# Patient Record
Sex: FEMALE | Race: WHITE
[De-identification: names, ages, dates, MRNs, and addresses within clinical notes are randomized per-mention and may not be internally consistent; named-entity substitution may affect disease eponyms.]

---

## 2017-07-19 ENCOUNTER — HOSPITAL ENCOUNTER (OUTPATIENT)
Dept: HOSPITAL 80 - CIMAGING | Age: 72
End: 2017-07-19
Attending: FAMILY MEDICINE
Payer: COMMERCIAL

## 2017-07-19 DIAGNOSIS — Z12.31: Primary | ICD-10-CM

## 2017-07-19 PROCEDURE — G0202 SCR MAMMO BI INCL CAD: HCPCS

## 2018-06-19 ENCOUNTER — HOSPITAL ENCOUNTER (OUTPATIENT)
Dept: HOSPITAL 80 - FPAT | Age: 73
End: 2018-06-19
Attending: INTERNAL MEDICINE
Payer: COMMERCIAL

## 2018-06-19 DIAGNOSIS — Z01.812: ICD-10-CM

## 2018-06-19 DIAGNOSIS — Z01.810: Primary | ICD-10-CM

## 2018-06-20 NOTE — CPEKG
Heart Rate: 87

RR Interval: 690

P-R Interval: 176

QRSD Interval: 86

QT Interval: 372

QTC Interval: 448

P Axis: 66

QRS Axis: 55

T Wave Axis: 39

EKG Severity - NORMAL ECG -

EKG Impression: SINUS RHYTHM

Electronically Signed By: Davide Soni 20-Jun-2018 16:35:02

## 2018-06-25 ENCOUNTER — HOSPITAL ENCOUNTER (OUTPATIENT)
Dept: HOSPITAL 80 - FSGY | Age: 73
Discharge: HOME | End: 2018-06-25
Attending: INTERNAL MEDICINE
Payer: COMMERCIAL

## 2018-06-25 VITALS — DIASTOLIC BLOOD PRESSURE: 60 MMHG | SYSTOLIC BLOOD PRESSURE: 131 MMHG

## 2018-06-25 DIAGNOSIS — E66.9: ICD-10-CM

## 2018-06-25 DIAGNOSIS — Z87.891: ICD-10-CM

## 2018-06-25 DIAGNOSIS — K57.30: ICD-10-CM

## 2018-06-25 DIAGNOSIS — K63.5: Primary | ICD-10-CM

## 2018-06-25 DIAGNOSIS — I10: ICD-10-CM

## 2018-06-25 DIAGNOSIS — E11.9: ICD-10-CM

## 2018-06-25 PROCEDURE — 0DBN8ZZ EXCISION OF SIGMOID COLON, VIA NATURAL OR ARTIFICIAL OPENING ENDOSCOPIC: ICD-10-PCS | Performed by: INTERNAL MEDICINE

## 2018-06-25 NOTE — POSTANESTH
Post Anesthetic Evaluation


Cardiovascular Status: Normal, Stable, Similar to Pre-Op Cond


Respiratory Status: Normal, Stable, Similar to Pre-op Cond.


Level of Consciousness/Mental Status: Can Participate in Eval, Moderately Sleepy


Pain Control: Adequate, Prn Tx Ordered


Nausea/Vomiting Control: Adequate, Prn Tx Ordered


Complications Possibly Related to Anesthesia: None Noted

## 2018-06-25 NOTE — GIREPORT
Formerly [facility], [facility]

Surgical Services - Endoscopy Department

_____________________________________________________________________________________________________
_______

Patient Name: Marisela Haq                            Procedure Date: 6/25/2018 9:49 AM

MRN: [id number]                                       Account Number: [id number]

Patient Type: Outpatient                             Attending  MD/ ER Physician: Ricky Mortnesen MD

_____________________________________________________________________________________________________
_______

 

Procedure:                    Colonoscopy

Indications:                  High risk colon cancer surveillance: Personal history of colonic polyps


Providers:                    Ricky Mortensen MD

Medicines:                    Propofol per Anesthesia

Complications:                No immediate complications.

Description of Procedure:     After obtaining informed consent, the scope was passed under direct vis
ion. 

                              Throughout the procedure, the patient's blood pressure, pulse, and oxyg
en 

                              saturations were monitored continuously. The Colonoscope with irrigatio
n 

                              channel was introduced through the anus and advanced to the cecum, 

                              identified by appendiceal orifice and ileocecal valve. The colonoscopy 
was 

                              performed without difficulty. The patient tolerated the procedure well.
 The 

                              quality of the bowel preparation was excellent. The ileocecal valve, 

                              appendiceal orifice, and rectum were photographed.

Findings:                     The perianal and digital rectal examinations were normal. Pertinent 

                              negatives include normal sphincter tone and no palpable rectal lesions.


                              A 5 mm polyp was found in the sigmoid colon. The polyp was sessile. The
 

                              polyp was removed with a cold biopsy forceps. Resection and retrieval w
ere 

                              complete.

                              Multiple small-mouthed diverticula were found in the sigmoid colon and 


                              ascending colon.

Estimated Blood Loss:         Estimated blood loss: none.

Post Op Diagnosis:            - One 5 mm polyp in the sigmoid colon, removed with a cold biopsy force
ps. 

                              Resected and retrieved.

                              - Diverticulosis.

Recommendation:               - Await pathology results.

                              - Repeat colonoscopy in 5 years for surveillance.

                              - Resume previous diet.

                              - Continue present medications.

                              - Patient has a contact number available for emergencies. The signs and
 

                              symptoms of potential delayed complications were discussed with the pat
ient. 

                              Return to normal activities tomorrow. Written discharge instructions we
re 

                              provided to the patient.

                              - Thank you for allowing me to be involved in the care of your patient.


Attending Participation:

     I personally performed the entire procedure without the assistance of a fellow, resident or surg
ical 

     assistant.

 

Ricky Mortensen MD

_______________

Ricky Mortensen MD

6/25/2018 10:21:30 AM

This report has been signed electronicallyDavid MD [place]

Number of Addenda: 0

 

Note Initiated On: 6/25/2018 9:49 AM

Total Procedure Duration Time 0 hours 17 minutes 16 seconds 

[email or web address]

## 2018-06-25 NOTE — PDGENHP
History & Physical


Chief Complaint: Surveillance colonoscopy


History of Present Illness: Personal history of colon polyps.  Surveillance 

colonoscopy with MAC


Pertinent Past, Social, Family History: DM type II, HTN, Obestiy.  No Tob.  

Rare ETOH.  No FH CRC


Relevant Physical Exam: NAD.  CTA B/L.  RRR without m/r/g. Distant heart 

sounds.  GI obese. Soft. NT/ND. NABS.


Cardiorespiratory Assessment: Colonoscopy with MAC.  ASA III

## 2018-06-25 NOTE — PDANEPAE
ANE Past Medical History





- Cardiovascular History


Hx Hypertension: Yes


Hx Arrhythmias: No


Hx Chest Pain: No


Hx Coronary Artery / Peripheral Vascular Disease: No


Hx CHF / Valvular Disease: No


Hx Palpitations: No





- Pulmonary History


Hx COPD: No


Hx Asthma/Reactive Airway Disease: No


Hx Recent Upper Respiratory Infection: No


Hx Oxygen in Use at Home: No


Hx Sleep Apnea: No


Sleep Apnea Screening Result - Last Documented: Negative





- Neurologic History


Hx Cerebrovascular Accident: No


Hx Seizures: No


Hx Dementia: No





- Endocrine History


Hx Diabetes: Yes


Obesity: moderate


Endocrine History Comment: type 11





- Renal History


Hx Renal Disorders: No





- Liver History


Hx Hepatic Disorders: No





- Neurological & Psychiatric Hx


Hx Neurological and Psychiatric Disorders: Yes


Neurological / Psychiatric History Comment: neuropathy right great toe





- Cancer History


Hx Cancer: No





- Congenital Disorder History


Hx Congenital Disorders: No





- GI History


Hx Gastrointestinal Disorders: Yes


Gastrointestinal History Comment: occasional diarhhea





- Other Health History


Other Health History: none





- Chronic Pain History


Chronic Pain: Yes (lower back, hips)





- Surgical History


Prior Surgeries: none





ANE Review of Systems


Review of Systems: 








- Exercise capacity


METS (RN): 3 METS





ANE Patient History





- Allergies


Allergies/Adverse Reactions: 








Iodinated Contrast- Oral and IV Dye Allergy (Verified 06/05/18 10:56)


 








- Home Medications


Home Medications: 








Acetaminophen  06/05/18 [Last Taken 06/25/18 07:00]


Ambien  06/05/18 [Last Taken 06/11/18]


Ascorbic Acid  06/05/18 [Last Taken 06/18/18]


Aspirin  06/05/18 [Last Taken 06/18/18]


Calcium Citrate/Vitamin D3  06/05/18 [Last Taken 06/18/18]


Cholecalciferol (Vitamin D3)  06/05/18 [Last Taken 06/18/18]


Glimepiride  06/05/18 [Last Taken 06/24/18]


Lantus  06/05/18 [Last Taken 06/24/18]


Lisinopril/Hydrochlorothiazide  06/05/18 [Last Taken 06/24/18]


Metformin HCl  06/05/18 [Last Taken 06/24/18]


Simvastatin  06/05/18 [Last Taken 06/24/18]


Vit B 12  06/05/18 [Last Taken 06/18/18]








- NPO status


NPO Since - Liquids (Date): 06/24/18


NPO Since - Liquids (Time): 21:00


NPO Since - Solids (Date): 06/24/18


NPO Since - Solids (Time): 09:00





- Anes Hx


Anes Hx: no prior problems





- Smoking Hx


Smoking Status: Former smoker





- Family Anes Hx


Family Hx Anesthesia Complications: none





ANE Labs/Vital Signs





- Vital Signs


Blood Pressure: 104/68


Heart Rate: 89


Respiratory Rate: 15


O2 Sat (%): 97


Height: 152.4 cm


Weight: 77.111 kg





ANE Physical Exam





- Airway


Neck exam: FROM


Mallampati Score: Class 3


Mouth exam: normal dental/mouth exam





- Pulmonary


Pulmonary: no respiratory distress, no rales or rhonchi, clear to auscultation





- Cardiovascular


Cardiovascular: regular rate and rhythym, no murmur, rub, or gallop





- ASA Status


ASA Status: III





ANE Anesthesia Plan


Anesthesia Plan: GA with mask

## 2019-01-03 ENCOUNTER — HOSPITAL ENCOUNTER (INPATIENT)
Dept: HOSPITAL 80 - F3N | Age: 74
LOS: 2 days | Discharge: HOME | DRG: 454 | End: 2019-01-05
Attending: ORTHOPAEDIC SURGERY | Admitting: ORTHOPAEDIC SURGERY
Payer: COMMERCIAL

## 2019-01-03 DIAGNOSIS — Z79.84: ICD-10-CM

## 2019-01-03 DIAGNOSIS — I10: ICD-10-CM

## 2019-01-03 DIAGNOSIS — G97.41: ICD-10-CM

## 2019-01-03 DIAGNOSIS — D64.89: ICD-10-CM

## 2019-01-03 DIAGNOSIS — M84.48XA: ICD-10-CM

## 2019-01-03 DIAGNOSIS — E86.9: ICD-10-CM

## 2019-01-03 DIAGNOSIS — M47.26: ICD-10-CM

## 2019-01-03 DIAGNOSIS — E11.9: ICD-10-CM

## 2019-01-03 DIAGNOSIS — M51.16: ICD-10-CM

## 2019-01-03 DIAGNOSIS — M43.16: Primary | ICD-10-CM

## 2019-01-03 DIAGNOSIS — Z79.4: ICD-10-CM

## 2019-01-03 PROCEDURE — 00QT0ZZ REPAIR SPINAL MENINGES, OPEN APPROACH: ICD-10-PCS | Performed by: ORTHOPAEDIC SURGERY

## 2019-01-03 PROCEDURE — P9016 RBC LEUKOCYTES REDUCED: HCPCS

## 2019-01-03 PROCEDURE — 0SG10AJ FUSION OF 2 OR MORE LUMBAR VERTEBRAL JOINTS WITH INTERBODY FUSION DEVICE, POSTERIOR APPROACH, ANTERIOR COLUMN, OPEN APPROACH: ICD-10-PCS | Performed by: ORTHOPAEDIC SURGERY

## 2019-01-03 PROCEDURE — 01NB0ZZ RELEASE LUMBAR NERVE, OPEN APPROACH: ICD-10-PCS | Performed by: ORTHOPAEDIC SURGERY

## 2019-01-03 PROCEDURE — 0SG1071 FUSION OF 2 OR MORE LUMBAR VERTEBRAL JOINTS WITH AUTOLOGOUS TISSUE SUBSTITUTE, POSTERIOR APPROACH, POSTERIOR COLUMN, OPEN APPROACH: ICD-10-PCS | Performed by: ORTHOPAEDIC SURGERY

## 2019-01-03 PROCEDURE — C1713 ANCHOR/SCREW BN/BN,TIS/BN: HCPCS

## 2019-01-03 PROCEDURE — 30233H0 TRANSFUSION OF AUTOLOGOUS WHOLE BLOOD INTO PERIPHERAL VEIN, PERCUTANEOUS APPROACH: ICD-10-PCS | Performed by: ORTHOPAEDIC SURGERY

## 2019-01-03 PROCEDURE — 0ST20ZZ RESECTION OF LUMBAR VERTEBRAL DISC, OPEN APPROACH: ICD-10-PCS | Performed by: ORTHOPAEDIC SURGERY

## 2019-01-03 PROCEDURE — 4A10X4G MONITORING OF CENTRAL NERVOUS ELECTRICAL ACTIVITY, INTRAOPERATIVE, EXTERNAL APPROACH: ICD-10-PCS | Performed by: ORTHOPAEDIC SURGERY

## 2019-01-03 PROCEDURE — 8E0WXBG COMPUTER ASSISTED PROCEDURE OF TRUNK REGION, WITH COMPUTERIZED TOMOGRAPHY: ICD-10-PCS | Performed by: ORTHOPAEDIC SURGERY

## 2019-01-03 RX ADMIN — Medication SCH MLS: at 22:59

## 2019-01-03 RX ADMIN — INSULIN HUMAN SCH UNITS: 100 INJECTION, SOLUTION PARENTERAL at 18:29

## 2019-01-03 RX ADMIN — INSULIN GLARGINE SCH UNITS: 100 INJECTION, SOLUTION SUBCUTANEOUS at 20:48

## 2019-01-03 RX ADMIN — DOCUSATE SODIUM AND SENNOSIDES SCH TAB: 50; 8.6 TABLET ORAL at 20:42

## 2019-01-03 RX ADMIN — ACETAMINOPHEN SCH MG: 500 TABLET ORAL at 21:28

## 2019-01-03 RX ADMIN — HYDROMORPHONE HYDROCHLORIDE PRN MG: 2 INJECTION INTRAMUSCULAR; INTRAVENOUS; SUBCUTANEOUS at 13:19

## 2019-01-03 RX ADMIN — METHOCARBAMOL PRN MG: 750 TABLET ORAL at 15:20

## 2019-01-03 RX ADMIN — OXYCODONE HYDROCHLORIDE PRN MG: 15 TABLET ORAL at 15:01

## 2019-01-03 RX ADMIN — OXYCODONE HYDROCHLORIDE PRN MG: 15 TABLET ORAL at 20:42

## 2019-01-03 RX ADMIN — FAMOTIDINE SCH MG: 20 TABLET, FILM COATED ORAL at 20:42

## 2019-01-03 RX ADMIN — ACETAMINOPHEN SCH MG: 500 TABLET ORAL at 15:00

## 2019-01-03 RX ADMIN — GLIMEPIRIDE SCH MG: 2 TABLET ORAL at 18:34

## 2019-01-03 RX ADMIN — HYDROMORPHONE HYDROCHLORIDE PRN MG: 2 INJECTION INTRAMUSCULAR; INTRAVENOUS; SUBCUTANEOUS at 12:34

## 2019-01-03 RX ADMIN — Medication SCH MLS: at 15:02

## 2019-01-03 RX ADMIN — INSULIN HUMAN SCH UNITS: 100 INJECTION, SOLUTION PARENTERAL at 21:30

## 2019-01-03 RX ADMIN — HYDROMORPHONE HYDROCHLORIDE PRN MG: 2 INJECTION INTRAMUSCULAR; INTRAVENOUS; SUBCUTANEOUS at 12:46

## 2019-01-03 RX ADMIN — AZELASTINE HYDROCHLORIDE SCH SPRAY: 137 SPRAY, METERED NASAL at 20:44

## 2019-01-03 NOTE — POSTANESTH
Post Anesthetic Evaluation


Cardiovascular Status: Normal, Stable


Respiratory Status: Normal, Stable


Level of Consciousness/Mental Status: Can Participate in Eval


Pain Control: Adequate, Prn Tx Ordered


Nausea/Vomiting Control: Inadeq, Add Tx Reqired


Complications Possibly Related to Anesthesia: None Noted

## 2019-01-03 NOTE — SUROPNOTE
[facility] Operative Report





- Surgery





Patient initials: LB


Date: 1/3/18


Pre-operative Diagnoses:


~ L3-5 Lumbar Degenerative disc disease and spondylosis


~ L4/5 Degenerative spondylolisthesis


~ Bilateral L4 pars insufficiency fractures


~ Lumbar spinal stenosis


Post-operative Diagnosis:~Same


Procedures:~


~ L3-5 Posterior Lumbar Decompression and Fusion with Instrumentation


~ Right L4/5 Transforaminal Lumbar Interbody Fusion (TLIF)


~ Structural use of morselized local autograft bone


~ Structural use of allograft~


~ Use of intra-operative fluoroscopy


~ Use of computer-aided stereotactic navigation


~ Use of intra-operative neuromonitoring, including EMG, MEP, and SSEP 

modalities


~ Use of a surgical microscope~~~


Surgeon:~Jm Laureano MD


Assist:~Barbara Howe


Anesthesia:~General endotracheal anesthesia


Findings:~As expected


Estimated Blood Loss:~500mL


Drains:~Hemovac sewn to skin


Specimens:~None


Complications:~None


Condition:~Transferred to PACU in stable condition.


Implants:~Medtronic Solera


~~Screws:~6.5x45 L3, L4; 6.5x40mm, L5


~~Rods:~60mm x 2 Ti 5.5


~~TLIF cage:~L4/5 7(11) x 23 Elevate Medtronic cage


~~Allograft:~10-mL allograft used structurally within the disc space and 

posterolateral gutters


~~Autograft:~local bone from decompression used structurally





Indications:


This patient was seen in my office and diagnosed with degenerative 

spondylolisthesis and spinal stenosis. I have explained all options of 

treatment for the patient, and the patient has elected to proceed with 

operative management. I have explained all risks, benefits, and alternatives of 

the proposed procedure. The risks that we have discussed include death, 

blindness, nerve damage, infection, dural tear, failure of surgery to alleviate 

pre-operative symptoms, and possible need for further operation. I explained 

separately the risks of allograft, including infection and disease transfer. In 

addition to the aforementioned procedure, I discussed with the patient that 

other procedures may be indicated during the course of surgery that would be 

considered in the patients best interest. The patient expressed understanding 

of this and agreed to move forward with operative management.





Pre-operative:


The proposed incision site was marked in the pre-operative holding area by me. 

The patient was then taken to the operating room in stable condition. Following 

smooth induction of general anesthesia, the patient was positioned prone on a 

Rommel table with all down surfaces well-padded. The patient was then prepped 

and draped in the usual sterile fashion. Pre-operative antibiotics were 

administered within one hour of the incision. A surgical timeout was performed, 

and all parties involved in the procedure were in agreement on the correct 

patient, location, and procedure to be performed.


Approach and dissection:


The proposed levels were identified using C-arm fluoroscopy and the skin was 

marked for the proposed incision. A midline incision was then made. 

Electrocautery was used to dissect the subdermal fat layer down to fascia and 

to coagulate bleeding vessels. The fascia was spared for later closure.





Spinal pause:


Two Kocher clamps were then placed on the spinous processes at both ends of the 

dissection. A secondary spinal pause was then performed, and the level was 

confirmed with all parties participating in the operation. The clamps were 

noted to be at the L3 and L5 levels.





Navigation:


At this point, the patient was covered, and an intra-operative CT scan was 

performed. Computer-aided stereotactic navigation would be used throughout some 

of the instrumentation portion of the case. After placement of the right L5 

screw, the navigation was noted to be poorly calibrated. Lateral xrays were 

used following that adjusement.





Pedicle screw placement:


Using a freehand technique with fluoroscopic assistance, a standard entry point 

was selected based on bony landmarks. A  hole was created with a high 

speed jimmie. The pedicle tract was then cannulated using a curved Lenke probe. A 

ball-tipped feeler was then passed through the cannulated tract to ensure that 

there was no breach on all sides: superior, inferior, medial, lateral, and 

ventral. A tap was then used up to a size that was 1mm below the final screw 

size based on pre-operative templating. A ball-tipped feeler was used again in 

a similar fashion to ensure a safe trajectory without breach. The tract length 

was then measured and recorded for future pedicle screw selection and 

placement. The pedicle tract neo then injected with a small amount of 

microfibrillar collagen agent to ensure hemostasis. This sequence was performed 

for all levels that would be included in the fusion. An appropriately-sized 

screw was placed at each level and confirmed fluoroscopically. All screws were 

then stimulated. Each screw was stimulated and potentials were all above 20 

milliAmps. This process was repeated in a similar fashion on the contralateral 

side.





Xray:


At this point, an additional lateral xray was performed to ensure that the 

proper placement of all screws.





Decompression:


All paraspinal muscles were dissected sub-periostally from the spinous 

processes and laminae. The dissection was then carried out to include the 

extent of decompression that was determined before surgery, with care being 

taken to preserve facet capsules that would not be included in the final 

fusion. The lateral pars was identified for all levels to be included in the 

proposed decompression. Using a combination of rongeur, jimmie, and Kerrison 

rongeurs, the spinous processes and laminae were removed at all levels to the 

subarticular lateral recess. Care was taken to leave a minimum of 8mm of bone 

from the lateral border of the pars at each decompressed level. The ligamentum 

flavum was resected at all levels. Using a Kerrison rongeur angled back, 

additional ligamentum flavum was removed from under the caudal aspect of the 

cephalad-most level, and from under the cephalad aspect of the caudal-most 

level. Additional care was taken to adequately decompress the lateral recess at 

all levels. At this time, a ball probe was used to probe all foraminae at the 

affected levels. Where necessary, a small Kerrison rongeur was used to 

decompress remaining bone and soft tissue so that all nerve roots would 

traverse freely through the foraminae. In total, the entire laminae of L3-L5 

were removed.





Of note, part of the dura was adhered to the ventral lamina at L3/4. A small 

dural tear was noted here, and was repaired primarily with 5-0 goretex suture 

without difficulty. The area was noted not to leak any CSF following the repair.





TLIF:


Based on pre-operative neurological symptoms, a decision was made with the 

patient to place a TLIF cage in the L4/5 interbody space from the right. Taking 

care to avoid breach into the previously created pedicle tract, a facetectomy 

was performed at the TLIF level using a high speed jimmie and Kerrison rongeurs. 

Special care was taken to not use the high speed jimmie in proximity to the 

underlying nerve root. Using a Penfield #4, the nerve was protected cephalad 

and Kambins triangle was identified (bordered by the nerve root cephalad and 

the thecal sac medially). The disc space was identified by appearance and 

consistency. With a disc knife angled away from neural structures, the annulus 

was transected and removed using a disc punch. A disc space dilator was used to 

free any remaining annulus, which was removed using a disc punch and/or 

scalpel. All remaining nucleus pulposus and cartilage from both cephalad and 

caudal end plates were removed using a series of straight and angled disc 

preparation curettes and paddle reva. A blunt-tipped long 10mL syringe was 

then passed into the disc space and flushed in order to remove any additional 

disc fragments; this was done multiple times until no further loose fragments 

were produced. Trial TLIF spacers were then passed into the disc space to both 

dilate the space and adequately size the final implant. Local autograft bone 

was then combined with allograft bone; 5mL of bone was inserted into the disc 

space using a specialized bone insertion device, and the remaining bone was 

placed into the interbody cage. An appropriately-sized cage was selected, 

packed with allograft and local autograft bone, and attached to an inserted 

handle. The TLIF cage was then malleted into place and positioning was 

confirmed by visual inspection and biplanar radiographs. Any bleeding epidural 

vessels were coagulated using bipolar cautery.





Bone graft:


All bony surfaces were decorticated using a high speed jimmie, including all 

facets to be fused. All local autograft bone was cleaned of soft tissue and 

morselized. All remaining allograft and autograft bone was placed in the facets 

being fused and lateral to the instrumentation in the posterolateral gutters.





Jerardo and set screw placement:


An appropriately-sized 60mm jerardo was then placed into the pedicle screw heads, 

with ample jerardo extending from either end. Set screws were then placed into all 

pedicle screws over the rods, and tensioned using a torque-limited screwdriver.





Closure:


The surgical field was then copiously irrigated with sterile saline. Of note, a 

small pinpoint hole was noted in the dura at the L4/5 level. Duraseal was 

placed on the dura. There was no spinal fluid leak seen during this time. 

Vancomycin powder was then applied to the surgical field. A small drain was 

placed deep to the fascia and brought out of the skin superior and laterally. 

The drain was then sewn to skin. #1 vicryl suture were used to repair the 

fascia in an interrupted fashion. Then 2-0 interrupted sutures were used to 

repair the dermal layer, and a separate 3-0 monofilament suture was used to 

repair the subcutaneous layer in a running fashion. All sutures used were 

absorbable. Topical adhesive was then applied to the skin and allowed to dry. A 

sterile island dressing was applied over the surgical incision. A surgical 

count was performed before initiation of closure and following the procedure, 

and all were correct. I was present for all critical portions of the procedure.





Surgical microscope use:


A surgical microscope was utilized throughout the decompressive portion of this 

case. This was deemed necessary for safe and accurate surgical decompression of 

affected nerve roots.





Neuromonitoring:


SSEP, MEP and EMG were used throughout the case from incision until the 

beginning of closure. There were no significant changes throughout the case, 

and SSEP signals were at their pre-surgical baseline levels before surgical 

closure was initiated.





Surgical assist:


A surgical assistant was used throughout the case, and deemed necessary for 

safe neural retraction, hemostasis, and suction.





Recovery:


The patient was extubated uneventfully in the operating room. The patient was 

taken to the recovery room in stable condition. Sequential compression devices 

for VTE prophylaxis were applied to the patients lower extremities, and were 

ordered to be used while the patient was non-ambulatory. Chemical VTE 

prophylaxis was considered to be contraindicated for this patient because of 

the risk of bleeding near the epidural space.~





Jm Laureano MD

## 2019-01-03 NOTE — PDANEPAE
ANE Past Medical History





- Cardiovascular History


Hx Hypertension: Yes


Hx Arrhythmias: No


Hx Chest Pain: No


Hx Coronary Artery / Peripheral Vascular Disease: No


Hx CHF / Valvular Disease: No


Hx Palpitations: No


Cardiovascular History Comment: Denies any cardiac history. Negative stress 

test several years ago. No SOB or CP with climbing a flight of stairs.





- Pulmonary History


Hx COPD: No


Hx Asthma/Reactive Airway Disease: No


Hx Recent Upper Respiratory Infection: No


Hx Oxygen in Use at Home: No


Hx Sleep Apnea: No


Sleep Apnea Screening Result - Last Documented: Negative


Pulmonary History Comment: treated with ABX for sinus infection 12/2018





- Neurologic History


Hx Cerebrovascular Accident: No


Hx Seizures: No


Hx Dementia: No


Neurologic History Comment: numbness, tingling BLE's, R>L





- Endocrine History


Hx Diabetes: Yes


Endocrine History Comment: DM Type 2





- Renal History


Hx Renal Disorders: No





- Liver History


Hx Hepatic Disorders: No





- Neurological & Psychiatric Hx


Hx Neurological and Psychiatric Disorders: No


Neurological / Psychiatric History Comment: neuropathy right great toe





- Cancer History


Hx Cancer: No





- Congenital Disorder History


Hx Congenital Disorders: No





- GI History


Hx Gastrointestinal Disorders: Yes


Gastrointestinal History Comment: frequent diarrhea





- Other Health History


Other Health History: wears glasses.  anemia.  chronic right hip, low back, 

left knee pain





- Chronic Pain History


Chronic Pain: Yes (right hip, leg)





- Surgical History


Prior Surgeries: colonoscopies.  arthroscopic partial meniscectomy, left knee, 

2008.  tonsillectomy





ANE Review of Systems


Review of Systems: 








- Exercise capacity


METS (RN): 3 METS





ANE Patient History





- Allergies


Allergies/Adverse Reactions: 








Iodinated Contrast- Oral and IV Dye Allergy (Verified 12/24/18 15:31)


 swelling at IV site








- Home Medications


Home Medications: 








Acetaminophen [Tylenol  mg (*)] 1,000 mg PO Q6HRS PRN 06/05/18 [Last 

Taken 01/03/19 0500]


Ascorbic Acid [Vitamin C 500 mg (*)] 1,000 mg PO HS 06/05/18 [Last Taken 12/27/ 18]


Aspirin [Aspirin 81mg (*)] 81 mg PO HS 06/05/18 [Last Taken 12/27/18]


Cholecalciferol Vit D3 [Vitamin D3 (*)] 1,000 units PO DAILY 06/05/18 [Last 

Taken 12/27/18]


Cyanocobalamin [Vitamin B12 (*)] 2,500 mcg PO HS 06/05/18 [Last Taken 12/27/18]


Glimepiride [Amaryl 2 MG (*)] 2 mg PO BIDMEAL 06/05/18 [Last Taken 01/02/19]


Insulin Glargine [Lantus] 20 unit SC HS 06/05/18 [Last Taken 01/02/19 25units]


Lisinopril/Hydrochlorothiazide [Zestoretic 20-25 mg Tablet] 1 each PO DAILY 06/ 05/18 [Last Taken 01/02/19]


Simvastatin [Zocor] 10 mg PO HS 06/05/18 [Last Taken 01/02/19]


metFORMIN HCL [Glucophage 500 mg (*)] 500 mg PO DAILY18 06/05/18 [Last Taken 01/ 01/19]


Azelastine [Astelin Nasal Spray (RX)] 1 sprays EACHNARE HS 12/17/18 [Last Taken 

01/02/19]


Diclofenac Sodium 1% [Voltaren Gel (*)] 1 wyatt TP DAILY PRN 12/17/18 [Last Taken 

12/24/18]


Herbals/Supplements -Info Only 1 ea PO DAILY 12/17/18 [Last Taken 12/27/18]


metFORMIN HCL [Glucophage 1000 mg] 1,000 mg PO DAILY 12/17/18 [Last Taken 01/02/ 19]








- NPO status


NPO Since - Liquids (Date): 01/03/19


NPO Since - Liquids (Time): 05:00


NPO Since - Solids (Date): 01/02/19


NPO Since - Solids (Time): 20:00





- Smoking Hx


Smoking Status: Former smoker





- Family Anes Hx


Family Hx Anesthesia Complications: none





ANE Labs/Vital Signs





- Vital Signs


Blood Pressure: 143/68


Heart Rate: 91


Respiratory Rate: 16


O2 Sat (%): 98


Height: 152.4 cm


Weight: 72.575 kg





ANE Physical Exam





- Airway


Neck exam: FROM


Mallampati Score: Class 2


Mouth exam: normal dental/mouth exam





- Pulmonary


Pulmonary: no respiratory distress, no rales or rhonchi, clear to auscultation





- Cardiovascular


Cardiovascular: regular rate and rhythym, no murmur, rub, or gallop





- ASA Status


ASA Status: III





ANE Anesthesia Plan


Anesthesia Plan: general endotracheal anesthesia

## 2019-01-03 NOTE — PDMN
Medical Necessity


Medical necessity: [facility] [place] [place] S820 Lumbar Fusion: 74 yo s/p L3/4, L4/5 TLIF w/ stealth 

and L3-5 posterior lumbar discectomy w/ stealth, [facility] only

## 2019-01-04 LAB — PLATELET # BLD: 262 10^3/UL (ref 150–400)

## 2019-01-04 RX ADMIN — INSULIN HUMAN SCH: 100 INJECTION, SOLUTION PARENTERAL at 08:34

## 2019-01-04 RX ADMIN — ACETAMINOPHEN SCH MG: 500 TABLET ORAL at 21:59

## 2019-01-04 RX ADMIN — ACETAMINOPHEN SCH MG: 500 TABLET ORAL at 05:38

## 2019-01-04 RX ADMIN — INSULIN HUMAN SCH UNITS: 100 INJECTION, SOLUTION PARENTERAL at 22:00

## 2019-01-04 RX ADMIN — PRAVASTATIN SODIUM SCH: 20 TABLET ORAL at 11:23

## 2019-01-04 RX ADMIN — METHOCARBAMOL PRN MG: 750 TABLET ORAL at 10:03

## 2019-01-04 RX ADMIN — HYDROCHLOROTHIAZIDE SCH: 25 TABLET ORAL at 08:37

## 2019-01-04 RX ADMIN — INSULIN HUMAN SCH UNITS: 100 INJECTION, SOLUTION PARENTERAL at 17:58

## 2019-01-04 RX ADMIN — DOCUSATE SODIUM AND SENNOSIDES SCH TAB: 50; 8.6 TABLET ORAL at 08:34

## 2019-01-04 RX ADMIN — OXYCODONE HYDROCHLORIDE PRN MG: 15 TABLET ORAL at 13:58

## 2019-01-04 RX ADMIN — LISINOPRIL SCH: 20 TABLET ORAL at 11:23

## 2019-01-04 RX ADMIN — PRAVASTATIN SODIUM SCH MG: 20 TABLET ORAL at 21:58

## 2019-01-04 RX ADMIN — INSULIN GLARGINE SCH UNITS: 100 INJECTION, SOLUTION SUBCUTANEOUS at 22:00

## 2019-01-04 RX ADMIN — INSULIN HUMAN SCH: 100 INJECTION, SOLUTION PARENTERAL at 13:51

## 2019-01-04 RX ADMIN — OXYCODONE HYDROCHLORIDE PRN MG: 15 TABLET ORAL at 17:55

## 2019-01-04 RX ADMIN — METHOCARBAMOL PRN MG: 750 TABLET ORAL at 03:46

## 2019-01-04 RX ADMIN — METHOCARBAMOL PRN MG: 750 TABLET ORAL at 14:12

## 2019-01-04 RX ADMIN — OXYCODONE HYDROCHLORIDE PRN MG: 15 TABLET ORAL at 08:37

## 2019-01-04 RX ADMIN — OXYCODONE HYDROCHLORIDE PRN MG: 15 TABLET ORAL at 03:43

## 2019-01-04 RX ADMIN — FAMOTIDINE SCH MG: 20 TABLET, FILM COATED ORAL at 08:37

## 2019-01-04 RX ADMIN — METHOCARBAMOL PRN MG: 750 TABLET ORAL at 17:54

## 2019-01-04 RX ADMIN — POLYETHYLENE GLYCOL 3350 PRN GM: 17 POWDER, FOR SOLUTION ORAL at 14:00

## 2019-01-04 RX ADMIN — GLIMEPIRIDE SCH MG: 2 TABLET ORAL at 17:55

## 2019-01-04 RX ADMIN — AZELASTINE HYDROCHLORIDE SCH SPRAY: 137 SPRAY, METERED NASAL at 21:58

## 2019-01-04 RX ADMIN — ACETAMINOPHEN SCH MG: 500 TABLET ORAL at 13:59

## 2019-01-04 RX ADMIN — FAMOTIDINE SCH MG: 20 TABLET, FILM COATED ORAL at 21:58

## 2019-01-04 RX ADMIN — DOCUSATE SODIUM AND SENNOSIDES SCH TAB: 50; 8.6 TABLET ORAL at 21:58

## 2019-01-04 RX ADMIN — GLIMEPIRIDE SCH MG: 2 TABLET ORAL at 08:35

## 2019-01-04 NOTE — ASMTCMCOM
CM Note

 

CM Note                       

Notes:

Pt had a planned lumbar decompression and fusion. Today PT/OT rec [facility]HC. CM to follow pt 

progress, ultimately will have to see if MD recommends [facility]HC. 

 

Date Signed:  01/04/2019 01:59 PM

Electronically Signed By:WON Branch

## 2019-01-05 VITALS — DIASTOLIC BLOOD PRESSURE: 56 MMHG | SYSTOLIC BLOOD PRESSURE: 127 MMHG

## 2019-01-05 PROCEDURE — 30233N1 TRANSFUSION OF NONAUTOLOGOUS RED BLOOD CELLS INTO PERIPHERAL VEIN, PERCUTANEOUS APPROACH: ICD-10-PCS | Performed by: ORTHOPAEDIC SURGERY

## 2019-01-05 RX ADMIN — OXYCODONE HYDROCHLORIDE PRN MG: 15 TABLET ORAL at 14:39

## 2019-01-05 RX ADMIN — LISINOPRIL SCH: 20 TABLET ORAL at 09:32

## 2019-01-05 RX ADMIN — INSULIN HUMAN SCH UNITS: 100 INJECTION, SOLUTION PARENTERAL at 12:14

## 2019-01-05 RX ADMIN — ACETAMINOPHEN SCH MG: 500 TABLET ORAL at 14:01

## 2019-01-05 RX ADMIN — FAMOTIDINE SCH MG: 20 TABLET, FILM COATED ORAL at 09:30

## 2019-01-05 RX ADMIN — OXYCODONE HYDROCHLORIDE PRN MG: 15 TABLET ORAL at 00:24

## 2019-01-05 RX ADMIN — OXYCODONE HYDROCHLORIDE PRN MG: 15 TABLET ORAL at 09:27

## 2019-01-05 RX ADMIN — OXYCODONE HYDROCHLORIDE PRN MG: 15 TABLET ORAL at 04:02

## 2019-01-05 RX ADMIN — HYDROCHLOROTHIAZIDE SCH: 25 TABLET ORAL at 09:32

## 2019-01-05 RX ADMIN — POLYETHYLENE GLYCOL 3350 PRN GM: 17 POWDER, FOR SOLUTION ORAL at 10:35

## 2019-01-05 RX ADMIN — METHOCARBAMOL PRN MG: 750 TABLET ORAL at 14:03

## 2019-01-05 RX ADMIN — GLIMEPIRIDE SCH MG: 2 TABLET ORAL at 09:30

## 2019-01-05 RX ADMIN — ACETAMINOPHEN SCH MG: 500 TABLET ORAL at 05:54

## 2019-01-05 RX ADMIN — METHOCARBAMOL PRN MG: 750 TABLET ORAL at 00:24

## 2019-01-05 RX ADMIN — INSULIN HUMAN SCH: 100 INJECTION, SOLUTION PARENTERAL at 08:30

## 2019-01-05 RX ADMIN — DOCUSATE SODIUM AND SENNOSIDES SCH TAB: 50; 8.6 TABLET ORAL at 09:34

## 2019-01-05 RX ADMIN — METHOCARBAMOL PRN MG: 750 TABLET ORAL at 09:33

## 2019-01-05 NOTE — PDCONSULT
Consultant Note: 





Patient being discharged.





Home meds:


Percocet and flexeril added.


Pt to hold ASA 81 for 7 days postop.


Resume all other home meds.





Georgi

## 2019-01-05 NOTE — ASMTLACE
LACE

 

Length of stay for            Answers:  3 days                                

current admission                                                             

Acuity / Level of             Answers:  Yes                                   

Care: Did the patient                                                         

have an inpatient                                                             

admission?                                                                    

Comorbidities - select        Answers:  Diabetes (uncontrolled or             

all that apply                          controlled)                           

                                        Opioid dependence                     

                                        / Chronic pain                        

                                        Other                         Notes:  HTN

# of Emergency department     Answers:  0                                     

visits in the last 6                                                          

months                                                                        

Score: 12

 

Date Signed:  01/05/2019 04:25 PM

Electronically Signed By:WON Branch

## 2019-01-05 NOTE — GPROG
I saw and evaluated the patient this afternoon.  She is overall doing quite well.  She notes that her
 legs feel than they did before the operation.  She notes no radicular symptoms at this time.  She ha
s been able to ambulate a little bit.  She denies any headache, nausea, or vomiting.  Her drain has a
 moderate amount of output and at the time of this dictation at 5 p.m. has a minimal amount of output
, total of 85 mL.



PHYSICAL EXAM:  She has no sensory, motor, or vascular deficits.  Her dressing is clean, dry, and int
act and left in place.



IMPRESSION:  Postoperative day 1 status post L3 to L5 lumbar decompression and fusion with instrument
ation.  Of note, she did have a small dural tear, which was repaired primarily and she has no residua
l symptoms from that.  I think we will leave the drain in place.  We will continue to mobilize her wi
th physical therapy.  At this point, anticipated discharge should be tomorrow if she does well.  I wi
ll see her again in the morning.





Job #:  [id number]

## 2019-01-05 NOTE — GPROG
DATE OF SERVICE:  01/05/2019



I saw and evaluated Marisela today.  I have spoken with her  as well as the nurse.  Her hemoglob
in was low, although she was asymptomatic except for some tachycardia.  Of note, the patient's preop 
hemoglobin was 10 and her baseline heart rate was in the low 100's.  At this time, her heart rate is 
114 with a normal blood pressure and we have already administered a unit of blood. 



On physical exam, the drain itself has very little output.  The dressing is clean, dry, and intact an
d left in place.  She is neurologically intact.  She does not complain of any headache, nausea, vomit
ing, or any other unusual complaints.  Lab values are reviewed.  Her insulin has been quite low and q
uite high.



IMPRESSION:  Postop day 2, status post lumbar decompression and fusion with instrumentation.



ASSESSMENT AND PLAN:  I think the best thing for this patient is going to be to get her home.  She is
 asymptomatic at this point with a hemoglobin of 7.  I suspect she is probably somewhat dry as well a
 little bit anemic.  She does not appear to have any demand ischemia.  She has cleared physical thera
py.  She has walked quite a bit.  I do think it is safe for her to go home.  We discussed maybe check
ing her blood values again tomorrow, but I think if she remains tachy only to the low 100's, that is 
going to be her baseline.  Of note, she has had a workup before in the past with an extensive GI work
up, which was all negative, so I think it is okay to take that into consideration at this time.  The 
patient has my cell phone and we have gone over all discharge instructions as well.





Job #:  [id number]

## 2019-01-05 NOTE — GDS
The patient underwent an uneventful L3 to L5 instrumented decompression and fusion with instrumentati
on on Thursday, January 3, 2019.  There was a small durotomy, which was repaired without consequence.
  She did very well following the operation.  She cleared physical therapy.  She was anemic to a hemo
globin of 7.1, and was subsequently given a unit of blood for that.  She was asymptomatic throughout 
her hospital course for anemia.  However, she did have some mild tachycardia, which resolved with the
 unit of blood.  On postoperative day 2, she was cleared for discharge home.  The drain was removed. 
 She was neurologically intact.  The dressing was left in place. 



Patient instructions are as follows:  No bending, lifting, or twisting, and she is to use the brace f
or the next 6 weeks anytime she is up for any longer trip than a short trip to the bathroom.  She has
 been prescribed pain medicine as well as muscle relaxers, which she will be taking at home, but not 
simultaneously.  She will see me at 2 weeks followup.  She has my cell phone as well as a pre-surgica
l packet if any further questions arise.





Job #:  [id number]

## 2019-01-05 NOTE — ASMTCMCOM
CM Note

 

CM Note                       

Notes:

MD does not rec HHC. Pt medically stable for d/c, no CM d/c needs identified. 

 

Date Signed:  01/05/2019 04:26 PM

Electronically Signed By:WON Branch

## 2019-04-16 ENCOUNTER — HOSPITAL ENCOUNTER (OUTPATIENT)
Dept: HOSPITAL 80 - CIMAGING | Age: 74
End: 2019-04-16
Attending: PODIATRIST
Payer: COMMERCIAL

## 2019-04-16 DIAGNOSIS — M76.62: Primary | ICD-10-CM

## 2019-06-12 ENCOUNTER — HOSPITAL ENCOUNTER (OUTPATIENT)
Dept: HOSPITAL 80 - FIMAGING | Age: 74
End: 2019-06-12
Payer: COMMERCIAL